# Patient Record
Sex: FEMALE | Race: BLACK OR AFRICAN AMERICAN | URBAN - METROPOLITAN AREA
[De-identification: names, ages, dates, MRNs, and addresses within clinical notes are randomized per-mention and may not be internally consistent; named-entity substitution may affect disease eponyms.]

---

## 2024-12-06 ENCOUNTER — TELEPHONE (OUTPATIENT)
Dept: OBSTETRICS AND GYNECOLOGY | Facility: CLINIC | Age: 29
End: 2024-12-06

## 2024-12-06 ENCOUNTER — APPOINTMENT (OUTPATIENT)
Dept: OBSTETRICS AND GYNECOLOGY | Facility: CLINIC | Age: 29
End: 2024-12-06
Payer: COMMERCIAL

## 2024-12-06 NOTE — TELEPHONE ENCOUNTER
Called patient to discuss scheduled appointment needing cancelled  Left vm for patient to return call.    Sis Christianson, BSN RN

## 2025-03-26 ENCOUNTER — OFFICE VISIT (OUTPATIENT)
Dept: URGENT CARE | Age: 30
End: 2025-03-26
Payer: COMMERCIAL

## 2025-03-26 VITALS
OXYGEN SATURATION: 97 % | DIASTOLIC BLOOD PRESSURE: 81 MMHG | TEMPERATURE: 98.5 F | RESPIRATION RATE: 16 BRPM | SYSTOLIC BLOOD PRESSURE: 119 MMHG | HEART RATE: 60 BPM

## 2025-03-26 DIAGNOSIS — M43.6 ACUTE MUSCLE STIFFNESS OF NECK: Primary | ICD-10-CM

## 2025-03-26 PROCEDURE — 99203 OFFICE O/P NEW LOW 30 MIN: CPT | Performed by: NURSE PRACTITIONER

## 2025-03-26 RX ORDER — LEVONORGESTREL/ETHIN.ESTRADIOL 0.1-0.02MG
TABLET ORAL
COMMUNITY

## 2025-03-26 RX ORDER — CYCLOBENZAPRINE HCL 10 MG
10 TABLET ORAL 2 TIMES DAILY
Qty: 20 TABLET | Refills: 0 | Status: SHIPPED | OUTPATIENT
Start: 2025-03-26 | End: 2025-04-05

## 2025-03-26 RX ORDER — IBUPROFEN 800 MG/1
400 TABLET ORAL EVERY 8 HOURS PRN
Qty: 15 TABLET | Refills: 0 | Status: SHIPPED | OUTPATIENT
Start: 2025-03-26 | End: 2025-04-05

## 2025-03-26 RX ORDER — PREDNISONE 20 MG/1
20 TABLET ORAL DAILY
Qty: 5 TABLET | Refills: 0 | Status: SHIPPED | OUTPATIENT
Start: 2025-03-26 | End: 2025-03-31

## 2025-03-26 ASSESSMENT — ENCOUNTER SYMPTOMS
NAUSEA: 0
HEADACHES: 0
WEAKNESS: 0
LIGHT-HEADEDNESS: 0
NECK PAIN: 1
SHORTNESS OF BREATH: 0
VOMITING: 0
FEVER: 0
ACTIVITY CHANGE: 0
APPETITE CHANGE: 0
NUMBNESS: 0
COUGH: 0
PSYCHIATRIC NEGATIVE: 1
WHEEZING: 0
DIZZINESS: 0
NECK STIFFNESS: 1
CHEST TIGHTNESS: 0

## 2025-03-26 NOTE — PATIENT INSTRUCTIONS
Place heating pad as tolerated  Follow up with your pcp in a week  Visit ER for worsening of any symptoms such as nausea, vomiting, blurred vision , dizziness or confusion

## 2025-03-26 NOTE — LETTER
March 26, 2025     Patient: Marisol Hutton   YOB: 1995   Date of Visit: 3/26/2025       To Whom It May Concern:    Marisol Hutton was seen in my clinic on 3/26/2025 at 8:05 am. Please excuse Marisol for her absence from work on this day to make the appointment.    If you have any questions or concerns, please don't hesitate to call.         Sincerely,         MARIA DOLORES Lechuga-CNP        CC: No Recipients

## 2025-03-26 NOTE — PROGRESS NOTES
Subjective   Patient ID: Marisol Hutton is a 29 y.o. female. They present today with a chief complaint of Other (Pt presents with a stiff neck can't turn right or left said she can't sleep and hard to work. ).    History of Present Illness  HPI patient is a 29-year-old female came in with a complaint of neck stiffness.  The symptoms started a week ago.  She woke up with this pain was worse a week ago. Better but not fully resolved.  Denies any numbness tingling shortness of breath blurred vision nausea and vomiting.  Pain is not radiating. Movement of neck is limited.     Past Medical History  Allergies as of 03/26/2025 - Reviewed 03/26/2025   Allergen Reaction Noted    Bee pollen Runny nose 07/23/2014       (Not in a hospital admission)       History reviewed. No pertinent past medical history.    History reviewed. No pertinent surgical history.         Review of Systems  Review of Systems   Constitutional:  Negative for activity change, appetite change and fever.   HENT:  Negative for congestion.    Respiratory:  Negative for cough, chest tightness, shortness of breath and wheezing.    Cardiovascular:  Negative for chest pain.   Gastrointestinal:  Negative for nausea and vomiting.   Musculoskeletal:  Positive for neck pain and neck stiffness.   Neurological:  Negative for dizziness, syncope, weakness, light-headedness, numbness and headaches.   Psychiatric/Behavioral: Negative.                                    Objective    Vitals:    03/26/25 0824   BP: 119/81   BP Location: Left arm   Patient Position: Sitting   Pulse: 60   Resp: 16   Temp: 36.9 °C (98.5 °F)   SpO2: 97%     Patient's last menstrual period was 02/26/2025 (exact date).    Physical Exam  Vitals reviewed.   Constitutional:       Appearance: Normal appearance.   Cardiovascular:      Rate and Rhythm: Normal rate and regular rhythm.      Pulses: Normal pulses.      Heart sounds: Normal heart sounds.   Pulmonary:      Effort: Pulmonary effort is  normal.      Breath sounds: Normal breath sounds.   Musculoskeletal:         General: Normal range of motion.   Skin:     General: Skin is warm.      Capillary Refill: Capillary refill takes less than 2 seconds.   Neurological:      General: No focal deficit present.      Mental Status: She is alert and oriented to person, place, and time.   Psychiatric:         Mood and Affect: Mood normal.         Behavior: Behavior normal.         Thought Content: Thought content normal.         Judgment: Judgment normal.         Procedures    Point of Care Test & Imaging Results from this visit  No results found for this visit on 03/26/25.   No results found.    Diagnostic study results (if any) were reviewed by ELYSSA Lechuga.    Assessment/Plan   Allergies, medications, history, and pertinent labs/EKGs/Imaging reviewed by ELYSSA Lechuga.     Medical Decision Making  Testing: none     Treatment: prednisone , flexeril, ibuprofen.   Differential: Polymyalgia rheumatica, tension headaches and fibromyalgia   Plan: Pt will follow up with PCP in the next 2-3 days. Return to the clinic for any worsening of the symptoms or go to ER.   PT understand the discharge instructions. All the questions been answered.   Impression:  acute muscle stiffness of neck     Orders and Diagnoses  Diagnoses and all orders for this visit:  Acute muscle stiffness of neck  -     cyclobenzaprine (Flexeril) 10 mg tablet; Take 1 tablet (10 mg) by mouth 2 times a day for 10 days.  -     ibuprofen 800 mg tablet; Take 0.5 tablets (400 mg) by mouth every 8 hours if needed for mild pain (1 - 3) for up to 10 days.  -     predniSONE (Deltasone) 20 mg tablet; Take 1 tablet (20 mg) by mouth once daily for 5 days.      Medical Admin Record      Patient disposition: Home    Electronically signed by ELYSSA Lechuga  9:34 AM